# Patient Record
Sex: MALE | Race: WHITE
[De-identification: names, ages, dates, MRNs, and addresses within clinical notes are randomized per-mention and may not be internally consistent; named-entity substitution may affect disease eponyms.]

---

## 2017-12-22 ENCOUNTER — HOSPITAL ENCOUNTER (OUTPATIENT)
Dept: HOSPITAL 62 - OD | Age: 72
End: 2017-12-22
Attending: UROLOGY
Payer: MEDICARE

## 2017-12-22 DIAGNOSIS — N40.1: Primary | ICD-10-CM

## 2017-12-22 DIAGNOSIS — N41.1: ICD-10-CM

## 2017-12-22 PROCEDURE — 84154 ASSAY OF PSA FREE: CPT

## 2017-12-22 PROCEDURE — 36415 COLL VENOUS BLD VENIPUNCTURE: CPT

## 2017-12-25 LAB
PSA FREE MFR SERPL: 30 %
PSA FREE SERPL-MCNC: 0.06 NG/ML
PSA SERPL-MCNC: 0.2 NG/ML (ref 0–4)

## 2018-03-06 LAB
APTT BLD: 29.1 SEC (ref 23.5–35.8)
ERYTHROCYTE [DISTWIDTH] IN BLOOD BY AUTOMATED COUNT: 12.9 % (ref 11.5–14)
HCT VFR BLD CALC: 42.6 % (ref 37.9–51)
HGB BLD-MCNC: 14.4 G/DL (ref 13.5–17)
INR PPP: 0.98
MCH RBC QN AUTO: 32 PG (ref 27–33.4)
MCHC RBC AUTO-ENTMCNC: 33.7 G/DL (ref 32–36)
MCV RBC AUTO: 95 FL (ref 80–97)
PLATELET # BLD: 243 10^3/UL (ref 150–450)
PROTHROMBIN TIME: 13.6 SEC (ref 11.4–15.4)
RBC # BLD AUTO: 4.49 10^6/UL (ref 4.35–5.55)
WBC # BLD AUTO: 6.8 10^3/UL (ref 4–10.5)

## 2018-03-20 ENCOUNTER — HOSPITAL ENCOUNTER (OUTPATIENT)
Dept: HOSPITAL 62 - OROUT | Age: 73
Discharge: HOME | End: 2018-03-20
Attending: PLASTIC SURGERY
Payer: MEDICARE

## 2018-03-20 VITALS — DIASTOLIC BLOOD PRESSURE: 85 MMHG | SYSTOLIC BLOOD PRESSURE: 155 MMHG

## 2018-03-20 DIAGNOSIS — Z79.01: ICD-10-CM

## 2018-03-20 DIAGNOSIS — C44.321: Primary | ICD-10-CM

## 2018-03-20 DIAGNOSIS — Z79.899: ICD-10-CM

## 2018-03-20 DIAGNOSIS — Z88.2: ICD-10-CM

## 2018-03-20 PROCEDURE — 85730 THROMBOPLASTIN TIME PARTIAL: CPT

## 2018-03-20 PROCEDURE — 93010 ELECTROCARDIOGRAM REPORT: CPT

## 2018-03-20 PROCEDURE — 88305 TISSUE EXAM BY PATHOLOGIST: CPT

## 2018-03-20 PROCEDURE — 93005 ELECTROCARDIOGRAM TRACING: CPT

## 2018-03-20 PROCEDURE — 15260 FTH/GFT FR N/E/E/L 20 SQCM/<: CPT

## 2018-03-20 PROCEDURE — 85027 COMPLETE CBC AUTOMATED: CPT

## 2018-03-20 PROCEDURE — 85610 PROTHROMBIN TIME: CPT

## 2018-03-20 PROCEDURE — 11642 EXC F/E/E/N/L MAL+MRG 1.1-2: CPT

## 2018-03-20 PROCEDURE — 36415 COLL VENOUS BLD VENIPUNCTURE: CPT

## 2018-03-20 PROCEDURE — 88331 PATH CONSLTJ SURG 1 BLK 1SPC: CPT

## 2018-03-20 NOTE — DISCHARGE SUMMARY
Discharge Summary (SDC)





- Discharge


Final Diagnosis: 





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


Date of Surgery: 03/20/18


Condition: Good


Treatment or Instructions: 



































Antibiotics for 1 day, then discontinue.





Elevate operative area to decrease swelling.





Do not strain, or lift heavy objects.





Call for excessive bleeding, increased temperature of 101, uncontrolled pain, 

or excessive nausea or vomiting.


You may reach Dr. Vuong through his office at [phone].


In the event of an emergency after hours, then contact Dr. Vuong through [facility].





Return to the office for a postop check on Thursday.  The time will be 

scheduled by the nursing staff  of [facility] prior to discharge.








Please give the patient a copy of their labs and EKG so they can bring this to 

their PMD.


Thank you





Portions of this note may be dictated using Dragon voice recognition software.


Occasional variations and spelling and vocabulary could be possible and are 

unintentional.


Additionally, there is a chance that some errors may not be caught or corrected.


Please notify the offer of any discrepancies noted or if any statements are 

unclear.





Referrals: 


LLOYD SMITH MD [Primary Care Provider] - 


Discharge Diet: As Tolerated


Report the Following to Your Physician Immediately: Unusual Bleeding, Increased 

Soreness - Keep head elevated. No bending or straining. Do not touch the nasal 

dressing. Do not blow the nose. The nasal dressing was stay in place for 6 days 

until Monday when we see you in the office to remove it.

## 2018-03-20 NOTE — OPERATIVE REPORT
Operative Report


DATE OF SURGERY: 03/20/18


PREOPERATIVE DIAGNOSIS: Squamous cell carcinoma of the right tip of nose


POSTOPERATIVE DIAGNOSIS: Same


OPERATION: Excision of squamous cell carcinoma from the right tip of nose with 

frozen section margin control and reconstruction with a full-thickness graft 

taken from the right clavicular area


SURGEON: ZAHIDA WILLIAM


ANESTHESIA: LMAC


TISSUE REMOVED OR ALTERED: Squamous cell carcinoma


COMPLICATIONS: 





None


ESTIMATED BLOOD LOSS: 3 cc


PROCEDURE: 





The patient was brought into the operating room.


The patient was laid on the operating room table in a supine position.


The patient was prepped and  draped in a sterile and aseptic fashion.


After a timeout we then went ahead and marked the area on the right tip of the 

nose to be resected.


The 12:00 margin the nasion.


The 3:00 margin the left hip and nose.


The 6:00 margin was towards the upper lip.


The 9:00 margin was towards the right cheek.


Then went ahead and anesthetize the area with 1% lidocaine with epinephrine.


Then went ahead and excise the area. 


 Stitch was placed at 12:00 and it was sent  for frozen section.


Frozen section results came back that the deep and lateral margins were clear.


We irrigated the wound with Betadine sterile water to lyse any remaining cancer 

cells.


We then went ahead and decided that because of the size of the defect we will 

proceed with a skin graft.


Decided to harvest the graft from the right clavicular area.


We then went ahead and harvest the full-thickness graft.


We closed the area with 4-0 Vicryl sutures and the skin was then closed with 3-

0 PDS with knots being tied on the outside and a support stitch in the center.


At the end of the case tincture of benzoin and Steri-Strips were applied with a 

light pressure dressing.


Graft was then defatted to the appropriate size and placed into the area of 

defect.


It was then sutured into place with 5-0 Prolene sutures leaving one end long.


After all the sutures were placed we then went ahead and applied Xeroform.


Then went ahead and created a bolus dressing and tied each suture 180 from 

each other.


Then tied the sutures again to each other.


Bacitracin was applied.


2 x 2's were applied and tincture benzoin and the dressing was taped into place.


At the end of the case the patient was doing well and brought to the PAR for 

recovery


The approximate size of the lesion was approximately 1.5 cm.


This dictation was performed using dragon naturally speaking.


If there are any inconsistencies please contact the dictating surgeon.











Subjective: No complaints


Objective:   Vital signs stable afebrile


                   No bleeding


                   Dressing intact


Assessment and plan:


                   Doing well.


                   Elevate the operative site.


                   Resume medications.  


                   Take antibiotics for 1 day


                   Follow-up Thursday


                   Full instructions were given to the patient and family and 

they understand





Portions of this note may be dictated using Dragon voice recognition software.


Occasional variations and spelling and vocabulary could be possible and are 

unintentional.


Additionally, there is a chance that some errors may not be caught or corrected.


Please notify the offer of any discrepancies noted or if any statements are 

unclear.